# Patient Record
Sex: MALE | Race: WHITE | Employment: FULL TIME | URBAN - METROPOLITAN AREA
[De-identification: names, ages, dates, MRNs, and addresses within clinical notes are randomized per-mention and may not be internally consistent; named-entity substitution may affect disease eponyms.]

---

## 2018-05-31 PROBLEM — R41.840 CONCENTRATION DEFICIT: Status: ACTIVE | Noted: 2018-05-31

## 2018-06-14 ENCOUNTER — HOSPITAL ENCOUNTER (OUTPATIENT)
Dept: ULTRASOUND IMAGING | Age: 58
Discharge: HOME | End: 2018-06-14
Attending: FAMILY MEDICINE
Payer: COMMERCIAL

## 2018-06-14 DIAGNOSIS — R74.8 ELEVATED LIVER ENZYMES: ICD-10-CM

## 2018-06-14 PROCEDURE — 76705 ECHO EXAM OF ABDOMEN: CPT

## 2018-06-14 NOTE — PROGRESS NOTES
Ultrasound results  Gallbladder ok  Enlarged liver consistent with fat     Losing weight, low saturated fat diet, limiting alcohol can help prevent further liver disease  Please call with results, will also release to [employer]

## 2018-06-18 NOTE — PROGRESS NOTES
Tried calling patient again today but phone service states patient is not taking calls at this time. Results w/ Dr. Arreaga Case recommendations and instructions mailed to patient.

## 2018-06-28 PROBLEM — K21.9 GASTROESOPHAGEAL REFLUX DISEASE WITHOUT ESOPHAGITIS: Status: ACTIVE | Noted: 2018-06-28

## 2018-11-05 PROBLEM — F39 MOOD DISORDER (HCC): Status: ACTIVE | Noted: 2018-11-05

## 2018-11-05 PROBLEM — G47.33 OBSTRUCTIVE SLEEP APNEA: Status: ACTIVE | Noted: 2018-11-05
